# Patient Record
Sex: FEMALE | Race: WHITE | Employment: OTHER | URBAN - METROPOLITAN AREA
[De-identification: names, ages, dates, MRNs, and addresses within clinical notes are randomized per-mention and may not be internally consistent; named-entity substitution may affect disease eponyms.]

---

## 2019-10-02 ENCOUNTER — THERAPY VISIT (OUTPATIENT)
Dept: PHYSICAL THERAPY | Facility: CLINIC | Age: 79
End: 2019-10-02
Payer: MEDICARE

## 2019-10-02 DIAGNOSIS — M25.561 CHRONIC PAIN OF RIGHT KNEE: ICD-10-CM

## 2019-10-02 DIAGNOSIS — G89.29 CHRONIC PAIN OF RIGHT KNEE: ICD-10-CM

## 2019-10-02 PROCEDURE — 97110 THERAPEUTIC EXERCISES: CPT | Mod: GP | Performed by: PHYSICAL THERAPIST

## 2019-10-02 PROCEDURE — 97161 PT EVAL LOW COMPLEX 20 MIN: CPT | Mod: GP | Performed by: PHYSICAL THERAPIST

## 2019-10-02 ASSESSMENT — ACTIVITIES OF DAILY LIVING (ADL)
RISE FROM A CHAIR: ACTIVITY IS VERY DIFFICULT
HOW_WOULD_YOU_RATE_THE_OVERALL_FUNCTION_OF_YOUR_KNEE_DURING_YOUR_USUAL_DAILY_ACTIVITIES?: ABNORMAL
GO DOWN STAIRS: ACTIVITY IS FAIRLY DIFFICULT
WEAKNESS: THE SYMPTOM AFFECTS MY ACTIVITY SLIGHTLY
KNEE_ACTIVITY_OF_DAILY_LIVING_SCORE: 45.71
KNEEL ON THE FRONT OF YOUR KNEE: I AM UNABLE TO DO THE ACTIVITY
GIVING WAY, BUCKLING OR SHIFTING OF KNEE: THE SYMPTOM AFFECTS MY ACTIVITY SLIGHTLY
HOW_WOULD_YOU_RATE_THE_CURRENT_FUNCTION_OF_YOUR_KNEE_DURING_YOUR_USUAL_DAILY_ACTIVITIES_ON_A_SCALE_FROM_0_TO_100_WITH_100_BEING_YOUR_LEVEL_OF_KNEE_FUNCTION_PRIOR_TO_YOUR_INJURY_AND_0_BEING_THE_INABILITY_TO_PERFORM_ANY_OF_YOUR_USUAL_DAILY_ACTIVITIES?: 75
RAW_SCORE: 32
LIMPING: THE SYMPTOM AFFECTS MY ACTIVITY SLIGHTLY
AS_A_RESULT_OF_YOUR_KNEE_INJURY,_HOW_WOULD_YOU_RATE_YOUR_CURRENT_LEVEL_OF_DAILY_ACTIVITY?: ABNORMAL
SWELLING: I HAVE THE SYMPTOM BUT IT DOES NOT AFFECT MY ACTIVITY
STIFFNESS: THE SYMPTOM AFFECTS MY ACTIVITY SLIGHTLY
GO UP STAIRS: ACTIVITY IS VERY DIFFICULT
STAND: ACTIVITY IS FAIRLY DIFFICULT
WALK: ACTIVITY IS SOMEWHAT DIFFICULT
SQUAT: I AM UNABLE TO DO THE ACTIVITY
SIT WITH YOUR KNEE BENT: ACTIVITY IS NOT DIFFICULT
PAIN: THE SYMPTOM AFFECTS MY ACTIVITY MODERATELY
KNEE_ACTIVITY_OF_DAILY_LIVING_SUM: 32

## 2019-10-02 NOTE — LETTER
"DEPARTMENT OF HEALTH AND HUMAN SERVICES  CENTERS FOR MEDICARE & MEDICAID SERVICES    PLAN/UPDATED PLAN OF PROGRESS FOR OUTPATIENT REHABILITATION    PATIENTS NAME:  Leola Abbasi   : 1940  PROVIDER NUMBER:    [id number]  HICN:  [id number]   PROVIDER NAME: [place] [facility] PHYSICAL THERAPY  MEDICAL RECORD NUMBER: [id number]   START OF CARE DATE:  SOC Date: 10/02/19   TYPE:  PT  PRIMARY/TREATMENT DIAGNOSIS: (Pertinent Medical Diagnosis)  Chronic pain of right knee    VISITS FROM START OF CARE:  Rxs Used: 1     [facility] Initial Evaluation -- Lower Extremity  Evaluation Date: 2019  Leola Abbasi is a 79 year old female with a R knee condition.   Referral: ortho  Work mechanical stresses: retired   Employment status: retired  Leisure mechanical stresses: sedentary  Functional disability score:   VAS score (0-10): 3/10  Patient goals/expectations:  To get the pain to go away and be able to move easier  She lives with her  in an apartment with an elevator, sometimes does the stairs.     HISTORY:  Present symptoms: R knee pain--joint, pain extends into the toes at times  Pain quality (sharp/shooting/stabbing/aching/burning/cramping):  aching  Present since (onset date): 2019    Symptoms (improving/unchaning/worsening):  improving.    Symptoms commenced as a result of: walking--felt a \"pop\" and fell towards her car--was able to grab on to the car and catch herself   Condition occurred in the following environment: unknown   Symptoms at onset: R knee pain in joint, posterior also   Paresthesia (yes/no):  no   Spinal history: none     Cough/Sneeze (pos/neg):  neg  Constant symptoms: R knee pain  Intermittent symptoms: none  Symptoms are worse with the following: Other - rising from sitting, sleeping--wakes several times/night, walking--feels like the R knee will \"give way\", stairs--non-reciprocal pattern using railing  Symptoms are better " "with the following: not being on the R knee  Continued use makes the pain (better/worse/no effect): worse  Disturbed night (yes/no): yes--wakes several times/night    Pain at rest (yes/no):  yes    Site (back/hip/knee/ankle/foot):  Back, R knee  Other questions (swelling/clicking/locking/giving way/falling):  Feels like it will give way but does not  Previous episodes: none  Previous treatments: cortisone injection, hyaluronic acid injection    Specific Questions:  General health (excellent/good/fair/poor):  good  Pertinent medical history includes: Diabetes, High blood pressure and Overweight  Medications (nil/NSAIDS/analg/steroids/anticoag/other):  OTC analgesic and Other - High blood pressure  Medical allergies:  adhesive  Imaging (none/Xray/MRI/other):  X-ray--R knee arthritis  Recent or major surgery (yes/no):  No--hx of ankle fracture with surgery, lists \"multiple other\"  Night pain (yes/no):  no  Accidents (yes/no):  no  Unexplained weight loss (yes/no):  no  Barriers at home: no  Other red flags: no  Sites for physical examination (back/hip/knee/ankle/foot/other): back, R knee    EXAMINATION    Posture:  Sitting (good/fair/poor): fair    Correction of Posture (better/worse/no effect/NA): NE  Standing (good/fair/poor): fair  Other observations:  Stands in slight lumbar flexion, R knee slightly flexed    Neurological: (NA/motor/sensory/reflexes/dural): na    Baselines (pain or functional activity): R knee pain with 50% full squat, R knee pain with rising from sitting    Extremities (Hip / Knee / Ankle / Foot): R knee    Movement Loss Irineo Mod Min Nil Pain   Flexion  113 degrees   X--ERP   Extension  13 degrees   X--PDM, ERP   Abduction        Adduction        Internal Rotation        External Rotation        Dorsiflexion        Plantarflexion        Inversion        Eversion          Passive Movement (+/- over pressure)/(PDM/ERP):  PROM:  Flexion 115 degrees, PDM, ERP; extension 4 degrees, ERP  Resisted Test " Response (pain): R knee flexion and extension 4+/5 with mild pain both  Other Tests: none    Spine:  Movement loss: flexion WNL, produces L posterior knee pain, increases R knee pain posteriorly; extension 15%, produces LBP; B SG 50%, NE.  Effect of repeated movements: NGUYEN--increases R knee pain initially, then decreases, NW/NB after, slightly less pain squat  Effect of static positioning:   Spine testing (not relevant/relevant/secondary problem): ?    Baseline Symptoms: R knee pain 3/10  Repeated Tests Symptom Response Mechanical Response   Active/Passive movement, resisted test, functional test During -  Produce, Abolish, Increase, Decrease, NE After -  Better, Worse, NB, NW, NE Effect -   ? or ? ROM, strength or key functional test No   Effect   Repeated R knee extension in sitting, AROM Decrease    Slightly better    Slight improvement squat, NE rising from sitting    Repeated R knee extension in sitting with self-OP Decrease    Better    Less pain rising from sitting, less pain squat                  Effect of static positioning                Provisional Classification (Extremity/Spine):  Extremity - Derangement      Princicple of Management:   Education:  POC, treatment rationale, expected responses    Equipment provided:  none  Exercise and dosage:  Repeated R knee extension in sitting with self-OP x10-12 reps, 4-5x/d/ay    ASSESSMENT/PLAN:  Patient is a 79 year old female with right side knee complaints.  Provisional classification of knee derangement with directional preference for extension.  She has decreased R knee ROM both actively and passively, and motions are painful.  She had decreased pain during and after repeated R knee extension exercises and will try at home to assess. Question a lumbar component to her pain due to radiating pain at times into the lower leg and foot and also changing symptoms with brief lumbar testing.  Tested repeated lumbar extension but no effect after.  Will assess lumbar  "further if not responding to or if plateauing with knee exercises.       Patient has the following significant findings with corresponding treatment plan.                Diagnosis 1:   R knee pain  Pain -  self management, education, directional preference exercise and home program  Decreased ROM/flexibility - therapeutic exercise and home program  Decreased strength - therapeutic exercise, therapeutic activities and home program  Impaired gait - gait training and home program  Decreased function - therapeutic activities and home program    Cumulative Therapy Evaluation is: Low complexity.    Previous and current functional limitations:  (See Goal Flow Sheet for this information)    Short term and Long term goals: (See Goal Flow Sheet for this information)     Communication ability:  Patient appears to be able to clearly communicate and understand verbal and written communication and follow directions correctly.  Treatment Explanation - The following has been discussed with the patient:   RX ordered/plan of care  Anticipated outcomes  Possible risks and side effects  This patient would benefit from PT intervention to resume normal activities.   Rehab potential is good.    Frequency:  1 X week, once daily  Duration:  for 8 weeks  Discharge Plan:  Achieve all LTG.  Independent in home treatment program.  Reach maximal therapeutic benefit.    Caregiver Signature/Credentials _____________________________ Date ________       Treating Provider: Lashaun Palmer PT   I have reviewed and certified the need for these services and plan of treatment while under my care.        PHYSICIAN'S SIGNATURE:   ___________________________  Date___________                              August Fitzpatrick MD    Certification period:  Beginning of Cert date period: 10/02/19 to  End of Cert period date: 12/11/19     Functional Level Progress Report: Please see attached \"Goal Flow sheet for Functional level.\"    ____X____ Continue Services or       " ________ DC Services                Service dates: From  SOC Date: 10/02/19 date to present

## 2019-10-09 ENCOUNTER — THERAPY VISIT (OUTPATIENT)
Dept: PHYSICAL THERAPY | Facility: CLINIC | Age: 79
End: 2019-10-09
Payer: MEDICARE

## 2019-10-09 DIAGNOSIS — M25.561 CHRONIC PAIN OF RIGHT KNEE: ICD-10-CM

## 2019-10-09 DIAGNOSIS — G89.29 CHRONIC PAIN OF RIGHT KNEE: ICD-10-CM

## 2019-10-09 PROCEDURE — 97110 THERAPEUTIC EXERCISES: CPT | Mod: GP | Performed by: PHYSICAL THERAPIST

## 2019-10-09 PROCEDURE — 97140 MANUAL THERAPY 1/> REGIONS: CPT | Mod: GP | Performed by: PHYSICAL THERAPIST

## 2019-10-09 PROCEDURE — 97112 NEUROMUSCULAR REEDUCATION: CPT | Mod: GP | Performed by: PHYSICAL THERAPIST

## 2019-10-16 ENCOUNTER — THERAPY VISIT (OUTPATIENT)
Dept: PHYSICAL THERAPY | Facility: CLINIC | Age: 79
End: 2019-10-16
Payer: MEDICARE

## 2019-10-16 DIAGNOSIS — G89.29 CHRONIC PAIN OF RIGHT KNEE: ICD-10-CM

## 2019-10-16 DIAGNOSIS — M25.561 CHRONIC PAIN OF RIGHT KNEE: ICD-10-CM

## 2019-10-16 PROCEDURE — 97110 THERAPEUTIC EXERCISES: CPT | Mod: GP | Performed by: PHYSICAL THERAPIST

## 2019-10-16 PROCEDURE — 97530 THERAPEUTIC ACTIVITIES: CPT | Mod: GP | Performed by: PHYSICAL THERAPIST

## 2019-10-23 ENCOUNTER — THERAPY VISIT (OUTPATIENT)
Dept: PHYSICAL THERAPY | Facility: CLINIC | Age: 79
End: 2019-10-23
Payer: MEDICARE

## 2019-10-23 DIAGNOSIS — M25.561 CHRONIC PAIN OF RIGHT KNEE: ICD-10-CM

## 2019-10-23 DIAGNOSIS — G89.29 CHRONIC PAIN OF RIGHT KNEE: ICD-10-CM

## 2019-10-23 PROCEDURE — 97110 THERAPEUTIC EXERCISES: CPT | Mod: GP | Performed by: PHYSICAL THERAPIST

## 2020-04-14 NOTE — PROGRESS NOTES
"Subjective:  HPI  Physical Exam                    Objective:  System    Physical Exam    General     ROS    Assessment/Plan:    DISCHARGE REPORT    Progress reporting period is from 10/02/2019 to 10/23/2019.       SUBJECTIVE  Subjective:  When last seen on 10/23/2019, patient reported,  \"Much, much better.\"  She reported her R knee was feeling better and less painful.  She could walk with her R toes pointing more forward.  She could do 4-5 stairs reciprocally if she had 2 railings.  Current status is unknown as patient did not return for additional follow-up visits.  Current Pain level:  As of 10/23/2019:  0/10  Initial Pain level: 3/10.   Changes in function:  Unknown as patient did not return for additional follow-up visits.  Adverse reaction to treatment or activity: Unknown as patient did not return for additional follow-up visits.    OBJECTIVE  Objective: As of 10/23/2019:  R knee AROM:  extension 2 degrees.  Gait improved but continued deviation due to weakness.     Current objective measurements are unavailable as patient did not return for additional follow-up visits.    ASSESSMENT/PLAN  Patient was seen for 4 visits with treatment focusing on R knee extension exercises and strengthening.  She made good progress throughout treatment and reported doing well at her last visit.  She has not returned for additional follow-up visits so current assessment is unavailable.  Updated problem list and treatment plan: Diagnosis 1:  R knee pain   No updated problem list or treatment plan as patient did not return for additional visits and is discharged from PT at this time.    STG/LTGs have been met or progress has been made towards goals:  Unknown as patient did not return for additional follow-up visits.  Assessment of Progress: The patient has not returned to therapy. Current status is unknown.  Self Management Plans:  Patient has been instructed in a home treatment program.  Patient  has been instructed in self " management of symptoms.  Leola continues to require the following intervention to meet STG and LTG's:  PT intervention is no longer required to meet STG/LTG.    Recommendations:  Patient is discharged from PT as she did not return for further follow-up visits.    Please refer to the daily flowsheet for treatment today, total treatment time and time spent performing 1:1 timed codes.

## 2021-07-18 ENCOUNTER — OFFICE VISIT (OUTPATIENT)
Dept: URGENT CARE | Facility: URGENT CARE | Age: 81
End: 2021-07-18
Payer: MEDICARE

## 2021-07-18 VITALS
DIASTOLIC BLOOD PRESSURE: 76 MMHG | BODY MASS INDEX: 30.96 KG/M2 | WEIGHT: 209 LBS | SYSTOLIC BLOOD PRESSURE: 127 MMHG | HEART RATE: 90 BPM | OXYGEN SATURATION: 95 % | TEMPERATURE: 98.3 F | HEIGHT: 69 IN | RESPIRATION RATE: 18 BRPM

## 2021-07-18 DIAGNOSIS — R39.15 URINARY URGENCY: ICD-10-CM

## 2021-07-18 DIAGNOSIS — N39.0 ACUTE UTI: Primary | ICD-10-CM

## 2021-07-18 DIAGNOSIS — R10.32 LLQ ABDOMINAL PAIN: ICD-10-CM

## 2021-07-18 LAB
ALBUMIN UR-MCNC: 30 MG/DL
APPEARANCE UR: ABNORMAL
BACTERIA #/AREA URNS HPF: ABNORMAL /HPF
BILIRUB UR QL STRIP: NEGATIVE
COLOR UR AUTO: YELLOW
GLUCOSE UR STRIP-MCNC: NEGATIVE MG/DL
HGB UR QL STRIP: ABNORMAL
KETONES UR STRIP-MCNC: NEGATIVE MG/DL
LEUKOCYTE ESTERASE UR QL STRIP: ABNORMAL
NITRATE UR QL: POSITIVE
PH UR STRIP: 5.5 [PH] (ref 5–7)
RBC #/AREA URNS AUTO: ABNORMAL /HPF
SP GR UR STRIP: 1.01 (ref 1–1.03)
UROBILINOGEN UR STRIP-ACNC: 0.2 E.U./DL
WBC #/AREA URNS AUTO: >100 /HPF

## 2021-07-18 PROCEDURE — 81001 URINALYSIS AUTO W/SCOPE: CPT

## 2021-07-18 PROCEDURE — 87088 URINE BACTERIA CULTURE: CPT | Performed by: PHYSICIAN ASSISTANT

## 2021-07-18 PROCEDURE — 87086 URINE CULTURE/COLONY COUNT: CPT | Performed by: PHYSICIAN ASSISTANT

## 2021-07-18 PROCEDURE — 87186 SC STD MICRODIL/AGAR DIL: CPT | Performed by: PHYSICIAN ASSISTANT

## 2021-07-18 PROCEDURE — 99203 OFFICE O/P NEW LOW 30 MIN: CPT | Performed by: PHYSICIAN ASSISTANT

## 2021-07-18 RX ORDER — CEFDINIR 300 MG/1
300 CAPSULE ORAL 2 TIMES DAILY
Qty: 20 CAPSULE | Refills: 0 | Status: SHIPPED | OUTPATIENT
Start: 2021-07-18 | End: 2021-07-28

## 2021-07-18 RX ORDER — LOVASTATIN 40 MG
1 TABLET ORAL AT BEDTIME
COMMUNITY
Start: 2021-05-18

## 2021-07-18 RX ORDER — PHENAZOPYRIDINE HYDROCHLORIDE 100 MG/1
100 TABLET, FILM COATED ORAL 3 TIMES DAILY PRN
Qty: 12 TABLET | Refills: 0 | Status: SHIPPED | OUTPATIENT
Start: 2021-07-18

## 2021-07-18 RX ORDER — ACETAMINOPHEN 500 MG
500 TABLET ORAL PRN
COMMUNITY

## 2021-07-18 RX ORDER — GLIMEPIRIDE 4 MG/1
6 TABLET ORAL DAILY
COMMUNITY
Start: 2021-05-12

## 2021-07-18 RX ORDER — LISINOPRIL 10 MG/1
10 TABLET ORAL DAILY
COMMUNITY
Start: 2021-05-18

## 2021-07-18 ASSESSMENT — MIFFLIN-ST. JEOR: SCORE: 1479.52

## 2021-07-18 NOTE — PATIENT INSTRUCTIONS

## 2021-07-18 NOTE — PROGRESS NOTES
"Patient presents with:  Urgent Care: LLQ abdominal pain and urinary urgency for 2-3 days.     (N39.0) Acute UTI  (primary encounter diagnosis)  Comment:   Plan: cefdinir (OMNICEF) 300 MG capsule,         phenazopyridine (PYRIDIUM) 100 MG tablet            (R39.15) Urinary urgency  Comment:   Plan: UA reflex to Microscopic and Culture, Urine         Microscopic Exam, Urine Culture, CANCELED: UA         reflex to Microscopic and Culture            (R10.32) LLQ abdominal pain  Comment: consistent with UTI  Plan: UA reflex to Microscopic and Culture, Urine         Microscopic Exam, Urine Culture, CANCELED: UA         reflex to Microscopic and Culture                SUBJECTIVE:   Leola Abbasi is a 81 year old female who presents today with urinary frequency and urgency for 2-3 days with discomfort with urination.  Also some low abdominal pain on left side.      Last BM was 2 days ago.        Current Outpatient Medications   Medication Sig Dispense Refill     Multiple Vitamins-Iron (DAILY-DUANE/IRON/BETA-CAROTENE) TABS TAKE 1 TABLET BY MOUTH DAILY. (Patient not taking: Reported on 10/19/2020) 30 tablet 7     Social History     Tobacco Use     Smoking status: Never Smoker     Smokeless tobacco: Never Used   Substance Use Topics     Alcohol use: Not on file     Family History   Problem Relation Age of Onset     Diabetes Mother      Diabetes Father          ROS:    10 point ROS of systems including Constitutional, Eyes, Respiratory, Cardiovascular, Gastroenterology, Genitourinary, Integumentary, Muscularskeletal, Psychiatric ,neurological were all negative except for pertinent positives noted in my HPI       OBJECTIVE:  /76 (BP Location: Right arm, Patient Position: Sitting, Cuff Size: Adult Large)   Pulse 96   Temp 98.3  F (36.8  C) (Tympanic)   Resp 18   Ht 1.74 m (5' 8.5\")   Wt 94.8 kg (209 lb)   SpO2 (!) 9%   BMI 31.31 kg/m    Physical Exam:  GENERAL APPEARANCE: healthy, alert and no distress  RESP: lungs " clear to auscultation - no rales, rhonchi or wheezes  CV: regular rates and rhythm, normal S1 S2, no murmur noted  ABDOMEN:  soft, nontender, no HSM or masses and bowel sounds normal  SKIN: no suspicious lesions or rashes

## 2021-07-21 LAB
BACTERIA UR CULT: ABNORMAL
BACTERIA UR CULT: ABNORMAL

## 2021-07-27 ENCOUNTER — NURSE TRIAGE (OUTPATIENT)
Dept: NURSING | Facility: CLINIC | Age: 81
End: 2021-07-27

## 2021-07-28 NOTE — TELEPHONE ENCOUNTER
Leola is returning a phone message from the clinic    She reports that she had called another time this week to return a previous message.    Her symptoms of UTI have resolved. She is still taking the antibiotic.    COVID 19 Nurse Triage Plan/Patient Instructions    Please be aware that novel coronavirus (COVID-19) may be circulating in the community. If you develop symptoms such as fever, cough, or SOB or if you have concerns about the presence of another infection including coronavirus (COVID-19), please contact your health care provider or visit [email or web address].     Disposition/Instructions    Home care recommended. Follow home care protocol based instructions.    Thank you for taking steps to prevent the spread of this virus.  o Limit your contact with others.  o Wear a simple mask to cover your cough.  o Wash your hands well and often.    Resources    [facility]: About COVID-19: [email or web address]    CDC: What to Do If You're Sick: www.cdc.gov/coronavirus/2019-ncov/about/steps-when-sick.html    CDC: Ending Home Isolation: www.cdc.gov/coronavirus/2019-ncov/hcp/disposition-in-home-patients.html     CDC: Caring for Someone: www.cdc.gov/coronavirus/2019-ncov/if-you-are-sick/care-for-someone.html     [facility]: Interim Guidance for Hospital Discharge to Home: www.health.[facility].mn.us/diseases/coronavirus/hcp/hospdischarge.pdf    [facility] clinical trials (COVID-19 research studies): clinicalaffairs.[facility]-clinical-trials     Below are the COVID-19 hotlines at the [facility] ([facility]). Interpreters are available.   o For health questions: Call 443-806-6009 or 1-948.137.4920 (7 a.m. to 7 p.m.)  o For questions about schools and childcare: Call 131-818-8498 or 1-571.881.1895 (7 a.m. to 7 p.m.)     Vania Dutton RN  [facility]        Additional Information    [1] Follow-up call to recent contact AND [2] information only call, no  triage required    Protocols used: INFORMATION ONLY CALL-A-AH

## 2021-09-05 ENCOUNTER — HEALTH MAINTENANCE LETTER (OUTPATIENT)
Age: 81
End: 2021-09-05

## 2022-03-24 ENCOUNTER — THERAPY VISIT (OUTPATIENT)
Dept: PHYSICAL THERAPY | Facility: CLINIC | Age: 82
End: 2022-03-24
Payer: MEDICARE

## 2022-03-24 ENCOUNTER — TRANSCRIBE ORDERS (OUTPATIENT)
Dept: OTHER | Age: 82
End: 2022-03-24

## 2022-03-24 DIAGNOSIS — M54.42 LEFT-SIDED LOW BACK PAIN WITH LEFT-SIDED SCIATICA: ICD-10-CM

## 2022-03-24 PROCEDURE — 97110 THERAPEUTIC EXERCISES: CPT | Mod: GP | Performed by: PHYSICAL THERAPIST

## 2022-03-24 PROCEDURE — 97161 PT EVAL LOW COMPLEX 20 MIN: CPT | Mod: GP | Performed by: PHYSICAL THERAPIST

## 2022-03-24 ASSESSMENT — ACTIVITIES OF DAILY LIVING (ADL)
STANDING_FOR_15_MINUTES: MODERATE DIFFICULTY
STEPPING_UP_AND_DOWN_CURBS: MODERATE DIFFICULTY
GOING_UP_1_FLIGHT_OF_STAIRS: MODERATE DIFFICULTY
WALKING_15_MINUTES_OR_GREATER: EXTREME DIFFICULTY
WALKING_INITIALLY: NO DIFFICULTY AT ALL
PUTTING_ON_SOCKS_AND_SHOES: NO DIFFICULTY AT ALL
RECREATIONAL_ACTIVITIES: MODERATE DIFFICULTY
HOW_WOULD_YOU_RATE_YOUR_CURRENT_LEVEL_OF_FUNCTION_DURING_YOUR_USUAL_ACTIVITIES_OF_DAILY_LIVING_FROM_0_TO_100_WITH_100_BEING_YOUR_LEVEL_OF_FUNCTION_PRIOR_TO_YOUR_HIP_PROBLEM_AND_0_BEING_THE_INABILITY_TO_PERFORM_ANY_OF_YOUR_USUAL_DAILY_ACTIVITIES?: 50
ROLLING_OVER_IN_BED: MODERATE DIFFICULTY
GOING_DOWN_1_FLIGHT_OF_STAIRS: SLIGHT DIFFICULTY
SITTING_FOR_15_MINUTES: NO DIFFICULTY AT ALL
GETTING_INTO_AND_OUT_OF_AN_AVERAGE_CAR: SLIGHT DIFFICULTY
LIGHT_TO_MODERATE_WORK: MODERATE DIFFICULTY
WALKING_APPROXIMATELY_10_MINUTES: EXTREME DIFFICULTY

## 2022-03-24 NOTE — PROGRESS NOTES
"Physical Therapy Initial Evaluation  Subjective:  The history is provided by the patient. No  was used.   Therapist Generated HPI Evaluation  Problem details: Patient had insidious onset of LB and left buttock pain about 1 year ago, MD order for PT 3-17-22.  Pain is intermittent 0-8/10, \"sharp\" and \"ache\" left buttock, across the low back and recently had some numbness left lateral thigh.  She denies symptoms right LE.  Symptoms increase with standing, with standing tolerance 5' due to pain and \"weakness\", losing 2-3 hours sleep due to pain and needing to use the restroom, wakes/\"sharp pain\" when needs to turn in bed.  Symptoms decrease with sitting down if standing, walking.  Patient lives in apartment with her , does majority of housework and meals.  Patient is very sedentary, sits in a recliner. .                     Pain is the same all the time.  Since onset symptoms are unchanged.     Special tests included:  X-ray (hip - negative).    Barriers include:  None as reported by patient.    Patient Health History           General health as reported by patient is good.  Pertinent medical history includes: diabetes, high blood pressure, menopausal and overweight.   Red flags:  None as reported by patient.  Medical allergies: other.   Surgeries include:  Orthopedic surgery. Other surgery history details: R ankle.    Current medications:  Anti-inflammatory and high blood pressure medication. Other medications details: diabetes.    Current occupation is Retired - RN.                                       Objective:  Standing Alignment:        Lumbar:  Lordosis decr (poor sitting posture, stands in slight trunk-flexed position)            Gait:  Slow taryn, trunk-flexed   Assistive Devices:  None                 Lumbar/SI Evaluation  ROM:    AROM Lumbar:   Flexion:        Hands to knees, pain left LB/buttock  Ext:                    33%-pain left LB/buttock   Side Bend:        Left:     " Right:   Rotation:           Left:     Right:   Side Glide:        Left:     Right:           Lumbar Myotomes:  normal                Lumbar Dermtomes:  Lumbar dermatomes: decrease sensation to light touch left L4,L5, S1.                Neural Tension/Mobility:    Left side:  Slump positive.     Right side:   Slump  negative.                                                    Symptoms prior to test movements:  No pain, poor posture  Correction of sitting posture with lumbar roll:  Produce left LB/upper buttock pain, decreases with further time  NGUYEN with hips against counter:  Increase left LB/upper buttock with each repetition, no worse following  Prone:  Pain left LB/buttock, decrease with time; sharp pain to move out of position (sleeps prone)   Flexion/rotation in left sidelying:  Produce numbness left LE to below the knee          Assessment/Plan:    Patient is a 81 year old female with lumbar complaints.    Patient has the following significant findings with corresponding treatment plan.                Diagnosis 1:  Left LBP with sciatica    Pain -  self management, education and home program  Decreased ROM/flexibility - therapeutic exercise and home program  Decreased function - therapeutic activities and home program  Impaired posture - neuro re-education and home program    Cumulative Therapy Evaluation is: Low complexity.    Previous and current functional limitations:  (See Goal Flow Sheet for this information)    Short term and Long term goals: (See Goal Flow Sheet for this information)     Communication ability:  Patient appears to be able to clearly communicate and understand verbal and written communication and follow directions correctly.  Treatment Explanation - The following has been discussed with the patient:   RX ordered/plan of care  Anticipated outcomes  Possible risks and side effects  This patient would benefit from PT intervention to resume normal activities.   Rehab potential is  good.    Frequency:  1 X week, once daily  Duration:  for 6 weeks  Discharge Plan:  Achieve all LTG.  Independent in home treatment program.  Reach maximal therapeutic benefit.    Please refer to the daily flowsheet for treatment today, total treatment time and time spent performing 1:1 timed codes.

## 2022-03-24 NOTE — PROGRESS NOTES
"                                                                           [facility]    OUTPATIENT Physical Therapy ORTHOPEDIC EVALUATION  PLAN OF TREATMENT FOR OUTPATIENT REHABILITATION  (COMPLETE FOR INITIAL CLAIMS ONLY)  Patient's Last Name, First Name, M.I.  YOB: 1940  Leola Abbasi    Provider s Name:  [facility]   Medical Record No.  [id number]   Start of Care Date:  03/24/22   Onset Date:   03/17/22 (MD order date )   Type:     _X__PT   ___OT Medical Diagnosis:    Encounter Diagnosis   Name Primary?     Left-sided low back pain with left-sided sciatica         Treatment Diagnosis:  Left LBP with left sciatica         Goals:     03/24/22 0500   Body Part   Goals listed below are for Lumbar   Goal #1   Goal #1 standing   Current Functional Level Minutes patient can stand   Performance level 5 with up to 8/10 pain as well as \"weakness\" requiring her to sit    STG Target Performance Minutes patient will be able to stand   Performance level 10 with 4/10 pain/\"weakness\" or less   Rationale for housekeeping tasks such as vacuuming, bed making, mowing, gardening;for personal hygiene;for meal preparation;for safe household ambulation;for safe community ambulation   Due date 04/14/22   LTG Target Performance Minutes patient will be able to stand   Performance Level 15 with 2/10 or less sxs lumbar only    Rationale for housekeeping tasks such as vacuuming, bed making, mowing;for personal hygiene;for meal preparation;for safe household ambulation;for safe community ambulation   Due date 05/05/22   Goal #2   Goal #2 self cares/transfers/bed mobility   Performance Level pain up to 8/10 with moving in bed    STG Performance Level pain 4/10 or less with moving in bed    Rationale for independent self care such as dressing, personal hygiene, bathing;for independent living   Due Date 04/14/22   Performance level 1/10 or less pain with moving in " bed    Rationale independence in self cares;for independent living   Due Date 05/05/22       Therapy Frequency:  1x/week  Predicted Duration of Therapy Intervention:  6 weeks     Belem Deras, PT                 I CERTIFY THE NEED FOR THESE SERVICES FURNISHED UNDER        THIS PLAN OF TREATMENT AND WHILE UNDER MY CARE .             Physician Signature               Date    X_____________________________________________________                             Certification Date From:  03/24/22   Certification Date To:  06/21/22    Referring Provider:  No ref. provider found    Initial Assessment        See Epic Evaluation SOC Date: 03/24/22

## 2022-03-31 ENCOUNTER — THERAPY VISIT (OUTPATIENT)
Dept: PHYSICAL THERAPY | Facility: CLINIC | Age: 82
End: 2022-03-31
Payer: MEDICARE

## 2022-03-31 DIAGNOSIS — M54.42 LEFT-SIDED LOW BACK PAIN WITH LEFT-SIDED SCIATICA: ICD-10-CM

## 2022-03-31 PROCEDURE — 97530 THERAPEUTIC ACTIVITIES: CPT | Mod: GP | Performed by: PHYSICAL THERAPIST

## 2022-03-31 PROCEDURE — 97110 THERAPEUTIC EXERCISES: CPT | Mod: GP | Performed by: PHYSICAL THERAPIST

## 2022-04-06 ENCOUNTER — THERAPY VISIT (OUTPATIENT)
Dept: PHYSICAL THERAPY | Facility: CLINIC | Age: 82
End: 2022-04-06
Payer: MEDICARE

## 2022-04-06 DIAGNOSIS — M54.42 LEFT-SIDED LOW BACK PAIN WITH LEFT-SIDED SCIATICA: ICD-10-CM

## 2022-04-06 PROCEDURE — 97110 THERAPEUTIC EXERCISES: CPT | Mod: GP | Performed by: PHYSICAL THERAPIST

## 2022-04-13 ENCOUNTER — THERAPY VISIT (OUTPATIENT)
Dept: PHYSICAL THERAPY | Facility: CLINIC | Age: 82
End: 2022-04-13
Payer: MEDICARE

## 2022-04-13 DIAGNOSIS — M54.42 LEFT-SIDED LOW BACK PAIN WITH LEFT-SIDED SCIATICA: Primary | ICD-10-CM

## 2022-04-13 PROCEDURE — 97110 THERAPEUTIC EXERCISES: CPT | Mod: GP | Performed by: PHYSICAL THERAPIST

## 2022-04-20 ENCOUNTER — THERAPY VISIT (OUTPATIENT)
Dept: PHYSICAL THERAPY | Facility: CLINIC | Age: 82
End: 2022-04-20
Payer: MEDICARE

## 2022-04-20 DIAGNOSIS — M54.42 LEFT-SIDED LOW BACK PAIN WITH LEFT-SIDED SCIATICA: Primary | ICD-10-CM

## 2022-04-20 PROCEDURE — 97530 THERAPEUTIC ACTIVITIES: CPT | Mod: GP | Performed by: PHYSICAL THERAPIST

## 2022-04-20 PROCEDURE — 97110 THERAPEUTIC EXERCISES: CPT | Mod: GP | Performed by: PHYSICAL THERAPIST

## 2022-04-20 NOTE — PROGRESS NOTES
"Subjective:  HPI  Physical Exam                    Objective:  System    Physical Exam    General     ROS    Assessment/Plan:    DISCHARGE REPORT    Progress reporting period is from 3-24-22 to 4-20-22, 5 visits.       SUBJECTIVE  Significant improvement this week.  Pain has been 0-4/10 left buttock, no LBP.  Has had 3-4 episodes of sharp pain with moving in bed this week vs several episodes every night.  Continues to have intermittent \"burning/numbness\" left lateral thigh, decreased frequency, no longer daily.  Pain left buttock most noticeable with sit-stand transitions, helps with direct pressure with her hand on the area.  Pleased with improvement overall with standing tolerance, at about 10' before starts getting some discomfort and weakness, but can stand longer.  Plans to start the 3x/week exercise class in her building, and has been walking more/longer distances in the building without issue.    Current Pain level: 0/10 (at rest).     Initial Pain level:  (0-8/10).   Changes in function:  Yes (See Goal flowsheet attached for changes in current functional level)  Adverse reaction to treatment or activity: None    OBJECTIVE  Lumbar extension 66% with discomfort left buttock, bilateral SG minimally limited with pain left buttock with right SG.  Fair sitting posture without cuing.     ASSESSMENT/PLAN  Updated problem list and treatment plan: Diagnosis 1:  LBP with left sciatica    Pain -  self management and home program  Decreased ROM/flexibility - therapeutic exercise and home program  Decreased function - home program  STG/LTGs have been met or progress has been made towards goals:  Yes (See Goal flow sheet completed today.)  Assessment of Progress: The patient's condition is improving.  Self Management Plans:  Patient is independent in a home treatment program.  I have re-evaluated this patient and find that the nature, scope, duration and intensity of the therapy is appropriate for the medical condition of " the patientNiki Bhagat continues to require the following intervention to meet STG and LTG's:  PT intervention is no longer required to meet STG/LTG.    Recommendations:  This patient is ready to be discharged from therapy and continue their home treatment program.    Please refer to the daily flowsheet for treatment today, total treatment time and time spent performing 1:1 timed codes.

## 2022-10-23 ENCOUNTER — HEALTH MAINTENANCE LETTER (OUTPATIENT)
Age: 82
End: 2022-10-23

## 2023-11-05 ENCOUNTER — HEALTH MAINTENANCE LETTER (OUTPATIENT)
Age: 83
End: 2023-11-05

## 2024-12-22 ENCOUNTER — HEALTH MAINTENANCE LETTER (OUTPATIENT)
Age: 84
End: 2024-12-22

## 2025-08-16 ENCOUNTER — APPOINTMENT (OUTPATIENT)
Dept: CT IMAGING | Facility: CLINIC | Age: 85
End: 2025-08-16
Attending: EMERGENCY MEDICINE
Payer: MEDICARE

## 2025-08-16 ENCOUNTER — HOSPITAL ENCOUNTER (EMERGENCY)
Facility: CLINIC | Age: 85
Discharge: HOME | End: 2025-08-16
Attending: EMERGENCY MEDICINE | Admitting: EMERGENCY MEDICINE
Payer: MEDICARE

## 2025-08-16 VITALS
OXYGEN SATURATION: 97 % | WEIGHT: 200 LBS | TEMPERATURE: 97.1 F | HEART RATE: 63 BPM | SYSTOLIC BLOOD PRESSURE: 142 MMHG | RESPIRATION RATE: 18 BRPM | HEIGHT: 69 IN | BODY MASS INDEX: 29.62 KG/M2 | DIASTOLIC BLOOD PRESSURE: 69 MMHG

## 2025-08-16 DIAGNOSIS — M54.41 ACUTE RIGHT-SIDED LOW BACK PAIN WITH RIGHT-SIDED SCIATICA: Primary | ICD-10-CM

## 2025-08-16 LAB
ALBUMIN UR-MCNC: 10 MG/DL
APPEARANCE UR: CLEAR
BILIRUB UR QL STRIP: NEGATIVE
COLOR UR AUTO: ABNORMAL
GLUCOSE UR STRIP-MCNC: NEGATIVE MG/DL
HGB UR QL STRIP: NEGATIVE
KETONES UR STRIP-MCNC: NEGATIVE MG/DL
LEUKOCYTE ESTERASE UR QL STRIP: ABNORMAL
NITRATE UR QL: NEGATIVE
PH UR STRIP: 6.5 [PH] (ref 5–7)
RBC URINE: 3 /HPF
SP GR UR STRIP: 1.02 (ref 1–1.03)
SQUAMOUS EPITHELIAL: 1 /HPF
UROBILINOGEN UR STRIP-MCNC: NORMAL MG/DL
WBC URINE: 1 /HPF

## 2025-08-16 PROCEDURE — 99284 EMERGENCY DEPT VISIT MOD MDM: CPT | Mod: 25 | Performed by: EMERGENCY MEDICINE

## 2025-08-16 PROCEDURE — 72131 CT LUMBAR SPINE W/O DYE: CPT

## 2025-08-16 PROCEDURE — 250N000013 HC RX MED GY IP 250 OP 250 PS 637: Performed by: EMERGENCY MEDICINE

## 2025-08-16 PROCEDURE — 81003 URINALYSIS AUTO W/O SCOPE: CPT | Performed by: EMERGENCY MEDICINE

## 2025-08-16 RX ORDER — LIDOCAINE 4 G/G
1 PATCH TOPICAL EVERY 24 HOURS
Qty: 10 PATCH | Refills: 0 | Status: SHIPPED | OUTPATIENT
Start: 2025-08-16

## 2025-08-16 RX ORDER — LIDOCAINE 4 G/G
1 PATCH TOPICAL ONCE
Status: DISCONTINUED | OUTPATIENT
Start: 2025-08-16 | End: 2025-08-16 | Stop reason: HOSPADM

## 2025-08-16 RX ORDER — ACETAMINOPHEN 325 MG/1
975 TABLET ORAL ONCE
Status: COMPLETED | OUTPATIENT
Start: 2025-08-16 | End: 2025-08-16

## 2025-08-16 RX ORDER — CYCLOBENZAPRINE HCL 10 MG
10 TABLET ORAL 3 TIMES DAILY PRN
Qty: 21 TABLET | Refills: 0 | Status: SHIPPED | OUTPATIENT
Start: 2025-08-16 | End: 2025-08-23

## 2025-08-16 RX ADMIN — ACETAMINOPHEN 975 MG: 325 TABLET ORAL at 10:26

## 2025-08-16 RX ADMIN — LIDOCAINE 1 PATCH: 4 PATCH TOPICAL at 10:27

## 2025-08-16 ASSESSMENT — COLUMBIA-SUICIDE SEVERITY RATING SCALE - C-SSRS
6. HAVE YOU EVER DONE ANYTHING, STARTED TO DO ANYTHING, OR PREPARED TO DO ANYTHING TO END YOUR LIFE?: NO
1. IN THE PAST MONTH, HAVE YOU WISHED YOU WERE DEAD OR WISHED YOU COULD GO TO SLEEP AND NOT WAKE UP?: NO
2. HAVE YOU ACTUALLY HAD ANY THOUGHTS OF KILLING YOURSELF IN THE PAST MONTH?: NO

## 2025-08-16 ASSESSMENT — ACTIVITIES OF DAILY LIVING (ADL)
ADLS_ACUITY_SCORE: 41